# Patient Record
Sex: FEMALE | Race: WHITE
[De-identification: names, ages, dates, MRNs, and addresses within clinical notes are randomized per-mention and may not be internally consistent; named-entity substitution may affect disease eponyms.]

---

## 2018-06-10 ENCOUNTER — HOSPITAL ENCOUNTER (EMERGENCY)
Dept: HOSPITAL 17 - PHEFT | Age: 53
Discharge: HOME | End: 2018-06-10
Payer: SELF-PAY

## 2018-06-10 VITALS — BODY MASS INDEX: 32.32 KG/M2 | HEIGHT: 65 IN | WEIGHT: 194.01 LBS

## 2018-06-10 VITALS
DIASTOLIC BLOOD PRESSURE: 70 MMHG | SYSTOLIC BLOOD PRESSURE: 127 MMHG | HEART RATE: 85 BPM | RESPIRATION RATE: 16 BRPM | OXYGEN SATURATION: 97 % | TEMPERATURE: 98.2 F

## 2018-06-10 VITALS — SYSTOLIC BLOOD PRESSURE: 123 MMHG | DIASTOLIC BLOOD PRESSURE: 74 MMHG

## 2018-06-10 DIAGNOSIS — K21.9: ICD-10-CM

## 2018-06-10 DIAGNOSIS — Z88.5: ICD-10-CM

## 2018-06-10 DIAGNOSIS — Z79.899: ICD-10-CM

## 2018-06-10 DIAGNOSIS — J44.9: ICD-10-CM

## 2018-06-10 DIAGNOSIS — N20.0: ICD-10-CM

## 2018-06-10 DIAGNOSIS — R31.9: Primary | ICD-10-CM

## 2018-06-10 LAB
COLOR UR: YELLOW
GLUCOSE UR STRIP-MCNC: (no result) MG/DL
HGB UR QL STRIP: (no result)
KETONES UR STRIP-MCNC: (no result) MG/DL
NITRITE UR QL STRIP: (no result)
RBC #/AREA URNS HPF: (no result) /HPF (ref 0–3)
SP GR UR STRIP: 1.02 (ref 1–1.03)
SQUAMOUS #/AREA URNS HPF: (no result) /HPF (ref 0–5)
URINE LEUKOCYTE ESTERASE: (no result)

## 2018-06-10 PROCEDURE — 74176 CT ABD & PELVIS W/O CONTRAST: CPT

## 2018-06-10 PROCEDURE — 87086 URINE CULTURE/COLONY COUNT: CPT

## 2018-06-10 PROCEDURE — 81001 URINALYSIS AUTO W/SCOPE: CPT

## 2018-06-10 NOTE — RADRPT
EXAM DATE:  6/10/2018 6:14 PM EDT

AGE/SEX:        53 years / Female



INDICATIONS:  Hematuria.



CLINICAL DATA:  This is the patient's initial encounter. Patient reports that signs and symptoms have
 been present for 1 day and indicates a pain score of 0/10. 

                                                                          

MEDICAL/SURGICAL HISTORY:       Chronic obstructive pulmonary disease.  Gastroesophageal reflux disea
se. Tubal ligation. Right lung surgery for collapsed lung.



RADIATION DOSE:  12.88 CTDI (mGy)









COMPARISON:      [facility], CT ABDOMEN & PELVIS W/O CONTRAST, 8/15/2016.  . 





TECHNIQUE:  Multiple contiguous axial images were obtained through the abdomen. Images were obtained 
using multiple row detector helical technique. Using dose reduction techniques, radiation dose was ke
pt as low as reasonably achievable to obtain optimal diagnostic quality images. 



FINDINGS: 

No acute findings in the visualized liver, spleen, adrenals, left kidney or pancreas. There are four 
tiny calculi in the right kidney. No bladder calculi. No hydronephrosis or evidence for obstructive u
ropathy. No acute bony abnormality.



CONCLUSION:

1.  Tiny 1 to 2 mm calculi in the upper and lower pole of the right kidney. No hydronephrosis or obst
ructive uropathy. No left renal calculi or bladder calculi. No acute findings. Findings similar to [place] 2016.



Electronically signed by: César Conway MD  6/10/2018 6:21 PM EDT

## 2018-06-10 NOTE — PD
HPI


Chief Complaint:   Complaint


Time Seen by Provider:  17:36


Travel History


International Travel<30 days:  No


Contact w/Intl Traveler<30days:  No


Traveled to known affect area:  No





History of Present Illness


HPI


Patient comes [facility] complaining of asymptomatic hematuria that she 

first noticed today.  Patient reports she first noticed after she voided 

initially and then started noticing on toilet paper when she wiped.  Patient 

denies any vaginal discharge, abdominal pain, back pain, fevers, dysuria, or 

previous episodes like this.  Patient denies being on a blood thinner.  Denies 

anything making symptoms better or worse.  Severity mild.





PFSH


Past Medical History


Hx Anticoagulant Therapy:  No


COPD:  Yes


Diabetes:  No


Diminished Hearing:  No


GERD:  Yes


Immunizations Current:  No


Pregnant?:  Not Pregnant


LMP:  TL-MENOPAUSAL


Menopausal:  Yes





Past Surgical History


Hysterectomy:  Yes (TUBAL)


Thoracic Surgery:  Yes (right lung)





Social History


Alcohol Use:  No


Tobacco Use:  No (quit in 2000)


Substance Use:  No





Allergies-Medications


(Allergen,Severity, Reaction):  


Coded Allergies:  


     morphine (Unverified  Allergy, Severe, VOMIT, 6/10/18)


Reported Meds & Prescriptions





Reported Meds & Active Scripts


Active


Bactrim DS (Sulfamethoxazole-Trimethoprim) 800-160 Mg Tab 1 Tab PO BID


Reported


Ranitidine (Ranitidine HCl) 150 Mg Tab 150 Mg PO DAILY








Review of Systems


Except as stated in HPI:  all other systems reviewed are Neg





Physical Exam


Narrative


GENERAL: Well-developed, overly nourished, in no acute distress, and non-ill 

appearing.


SKIN: Focused skin assessment warm and dry.


HEAD: Atraumatic. Normocephalic. 


EYES: Pupils equal and round. EOMI. No scleral icterus. No injection or 

drainage. 


ENT: No nasal bleeding or discharge.  Mucous membranes pink and moist.


NECK: Trachea midline. Supple.  No nuclear rigidity.


RESPIRATORY: No accessory muscle use.  No respiratory distress.  


GASTROINTESTINAL: Abdomen soft, non-tender, nondistended, and no guarding. 

Hepatic and splenic margins not palpable.  No CVA tenderness.  No pulsatile 

mass.


MUSCULOSKELETAL: No obvious deformities. No clubbing.  No cyanosis.  No edema.  

Full range of motion.


NEUROLOGICAL: Awake and alert. No obvious cranial nerve deficits.  Motor 

grossly within normal limits. Normal speech.


PSYCHIATRIC: Appropriate mood and affect; insight and judgment normal.





Data


Data


Last Documented VS





Vital Signs








  Date Time  Temp Pulse Resp B/P (MAP) Pulse Ox O2 Delivery O2 Flow Rate FiO2


 


6/10/18 19:04  80 16 123/74 (90) 98   


 


6/10/18 16:56 98.2       








Orders





 Orders


Urinalysis - C+S If Indicated (6/10/18 17:02)


Urine Culture (6/10/18 17:04)


Ct Abd/Pel W/O Iv Contrast (6/10/18 17:55)


Ed Discharge Order (6/10/18 18:28)


Mandatory Outpatient Referral (6/10/18 18:28)





Labs





Laboratory Tests








Test


  6/10/18


17:04


 


Urine Collection Type CLEAN CATCH 


 


Urine Color YELLOW 


 


Urine Turbidity CLOUDY 


 


Urine pH 6.0 


 


Urine Specific Gravity 1.025 


 


Urine Protein TRACE mg/dL 


 


Urine Glucose (UA) NEG mg/dL 


 


Urine Ketones NEG mg/dL 


 


Urine Occult Blood LARGE 


 


Urine Nitrite NEG 


 


Urine Bilirubin NEG 


 


Urine Urobilinogen 0.2 MG/DL 


 


Urine Leukocyte Esterase TRACE 


 


Urine -200 /hpf 


 


Urine WBC 20-24 /hpf 


 


Urine Squamous Epithelial


Cells 6-8 /hpf 


 


 


Microscopic Urinalysis Comment


  CULTURE


INDICATED











MDM


Medical Decision Making


Medical Screen Exam Complete:  Yes


Emergency Medical Condition:  Yes


Interpretation(s)





Last Impressions








Abdomen/Pelvis CT 6/10/18 1755 Signed





Impressions: 





 CONCLUSION:





 1.  Tiny 1 to 2 mm calculi in the upper and lower pole of the right kidney. No 





 hydronephrosis or obstructive uropathy. No left renal calculi or bladder calcul





 i. No acute findings. Findings similar to August 2016.





  





 








Differential Diagnosis


UTI, renal calculi, asymptomatic hematuria, mass


Narrative Course


Patient with asymptomatic hematuria. No obvious evidence of infection. This 

finding was discussed with the patient and the patient was instructed to follow 

up with Urology to recheck urine and rule out etiologies such as cancer.  

Mandatory referral was placed.  Patient agreed with plan.





Patient in no obvious distress upon re-evaluation. All pertinent laboratory/

Radiology result(s) discussed with patient. Patient was asked if they wanted to 

speak to my attending, which the patient did not wish to do at this time.  Any 

questions/concerns in reference to patient diagnosis/condition discussed and 

clarified prior to patient's discharge. Reinforced sheer importance of close 

follow up with patient's primary physician or primary care clinic and 

urologist. Instructed patient to return to ED immediately, if symptoms return/

worsen. Patient showed understanding of above instructions.  Further 

instructions and recommendations were detailed in discharge paperwork.  Patient 

ambulated without difficulty out of ED at discharge.





Diagnosis





 Primary Impression:  


 Hematuria


 Qualified Codes:  R31.9 - Hematuria, unspecified


Referrals:  


Joe Melgoza MD





[facility]


Patient Instructions:  General Instructions, Hematuria (ED)





***Additional Instructions:  


Follow-up with your primary care physician and/or urologist for further 

evaluation of your asymptomatic hematuria.  Take all medication as prescribed.  

Return to the emergency department if symptoms get worse.


***Med/Other Pt SpecificInfo:  Prescription(s) given


Scripts


Sulfamethoxazole-Trimethoprim (Bactrim DS) 800-160 Mg Tab


1 TAB PO BID for Infection, #20 TAB 0 Refills


   Prov: Ana Luisa Morris MD         6/10/18


Disposition:  01 DISCHARGE HOME


Condition:  Stable











Carmelo Estrada Ivan 10, 2018 17:38